# Patient Record
Sex: MALE | Race: WHITE | Employment: FULL TIME | URBAN - METROPOLITAN AREA
[De-identification: names, ages, dates, MRNs, and addresses within clinical notes are randomized per-mention and may not be internally consistent; named-entity substitution may affect disease eponyms.]

---

## 2019-04-06 ENCOUNTER — APPOINTMENT (OUTPATIENT)
Dept: GENERAL RADIOLOGY | Age: 29
End: 2019-04-06
Payer: COMMERCIAL

## 2019-04-06 ENCOUNTER — HOSPITAL ENCOUNTER (EMERGENCY)
Age: 29
Discharge: HOME | End: 2019-04-06
Attending: EMERGENCY MEDICINE
Payer: COMMERCIAL

## 2019-04-06 VITALS
RESPIRATION RATE: 18 BRPM | SYSTOLIC BLOOD PRESSURE: 164 MMHG | TEMPERATURE: 98.1 F | BODY MASS INDEX: 31.78 KG/M2 | WEIGHT: 222 LBS | HEART RATE: 105 BPM | OXYGEN SATURATION: 98 % | DIASTOLIC BLOOD PRESSURE: 89 MMHG | HEIGHT: 70 IN

## 2019-04-06 DIAGNOSIS — R06.2 WHEEZING: Primary | ICD-10-CM

## 2019-04-06 PROCEDURE — 71046 X-RAY EXAM CHEST 2 VIEWS: CPT

## 2019-04-06 PROCEDURE — 94664 DEMO&/EVAL PT USE INHALER: CPT

## 2019-04-06 PROCEDURE — 94644 CONT INHLJ TX 1ST HOUR: CPT

## 2019-04-06 PROCEDURE — 6370000000 HC RX 637 (ALT 250 FOR IP): Performed by: EMERGENCY MEDICINE

## 2019-04-06 PROCEDURE — 94640 AIRWAY INHALATION TREATMENT: CPT

## 2019-04-06 PROCEDURE — 99285 EMERGENCY DEPT VISIT HI MDM: CPT

## 2019-04-06 PROCEDURE — 6360000002 HC RX W HCPCS: Performed by: EMERGENCY MEDICINE

## 2019-04-06 RX ORDER — PREDNISONE 20 MG/1
60 TABLET ORAL ONCE
Status: COMPLETED | OUTPATIENT
Start: 2019-04-06 | End: 2019-04-06

## 2019-04-06 RX ORDER — IPRATROPIUM BROMIDE AND ALBUTEROL SULFATE 2.5; .5 MG/3ML; MG/3ML
1 SOLUTION RESPIRATORY (INHALATION) ONCE
Status: COMPLETED | OUTPATIENT
Start: 2019-04-06 | End: 2019-04-06

## 2019-04-06 RX ORDER — PREDNISONE 20 MG/1
60 TABLET ORAL DAILY
Qty: 12 TABLET | Refills: 0 | Status: SHIPPED | OUTPATIENT
Start: 2019-04-07 | End: 2019-04-11

## 2019-04-06 RX ORDER — IBUPROFEN 600 MG/1
600 TABLET ORAL EVERY 6 HOURS PRN
COMMUNITY

## 2019-04-06 RX ORDER — ALBUTEROL SULFATE 2.5 MG/3ML
5 SOLUTION RESPIRATORY (INHALATION) ONCE
Status: COMPLETED | OUTPATIENT
Start: 2019-04-06 | End: 2019-04-06

## 2019-04-06 RX ORDER — ALBUTEROL SULFATE 90 UG/1
2 AEROSOL, METERED RESPIRATORY (INHALATION) EVERY 4 HOURS PRN
Qty: 1 INHALER | Refills: 0 | Status: SHIPPED | OUTPATIENT
Start: 2019-04-06

## 2019-04-06 RX ADMIN — IPRATROPIUM BROMIDE AND ALBUTEROL SULFATE 1 AMPULE: .5; 3 SOLUTION RESPIRATORY (INHALATION) at 20:18

## 2019-04-06 RX ADMIN — PREDNISONE 60 MG: 20 TABLET ORAL at 20:02

## 2019-04-06 RX ADMIN — ALBUTEROL SULFATE 5 MG: 2.5 SOLUTION RESPIRATORY (INHALATION) at 21:01

## 2019-04-06 ASSESSMENT — PAIN DESCRIPTION - PAIN TYPE: TYPE: ACUTE PAIN

## 2019-04-06 ASSESSMENT — PAIN SCALES - GENERAL
PAINLEVEL_OUTOF10: 4
PAINLEVEL_OUTOF10: 0

## 2019-04-06 ASSESSMENT — PAIN DESCRIPTION - LOCATION: LOCATION: HEAD

## 2019-04-06 ASSESSMENT — PAIN DESCRIPTION - DESCRIPTORS: DESCRIPTORS: ACHING

## 2019-04-07 NOTE — ED PROVIDER NOTES
connections:     Talks on phone: Not on file     Gets together: Not on file     Attends Orthodoxy service: Not on file     Active member of club or organization: Not on file     Attends meetings of clubs or organizations: Not on file     Relationship status: Not on file    Intimate partner violence:     Fear of current or ex partner: Not on file     Emotionally abused: Not on file     Physically abused: Not on file     Forced sexual activity: Not on file   Other Topics Concern    Not on file   Social History Narrative    Not on file     No current facility-administered medications for this encounter. Current Outpatient Medications   Medication Sig Dispense Refill    ibuprofen (ADVIL;MOTRIN) 600 MG tablet Take 600 mg by mouth every 6 hours as needed for Pain      [START ON 4/7/2019] predniSONE (DELTASONE) 20 MG tablet Take 3 tablets by mouth daily for 4 days 12 tablet 0    albuterol sulfate HFA (PROVENTIL HFA) 108 (90 Base) MCG/ACT inhaler Inhale 2 puffs into the lungs every 4 hours as needed for Wheezing or Shortness of Breath 1 Inhaler 0     Allergies   Allergen Reactions    Silver Nitrate        Nursing Notes Reviewed    ROS:  General: no fevers/chills  HEENT: no congestion, no sore throat, no ear pain  Eyes: no discharge, no vision changes  Cardiac: no chest pain, no palpitations  Pulmonary: + SOB, minimal cough  GI: no N/V/D, no abdominal pain  : no dysuria  Musculoskeletal: no joint pain or swelling  Skin: no rash or unusual bruising  Neurologic: + headaches, no dizziness    Physical Exam:  ED Triage Vitals [04/06/19 1931]   Enc Vitals Group      BP (!) 147/100      Pulse 99      Resp 16      Temp 98.1 °F (36.7 °C)      Temp Source Oral      SpO2 95 %      Weight 222 lb 0.1 oz (100.7 kg)      Height 5' 10\" (1.778 m)      Head Circumference       Peak Flow       Pain Score       Pain Loc       Pain Edu? Excl. in [address]?       My pulse oximetry interpretation is which is within the normal range    GENERAL APPEARANCE: Awake and alert. Cooperative. Well appearing. HEAD: Normocephalic. Atraumatic. EYES: PERRL, normal conjunctiva  ENT: Mucous membranes are moist.  Hypertrophic tonsils, uvula midline, no erythema or edema  NECK: Supple. HEART: RRR. LUNGS: Respirations unlabored without retractions. Inspiratory wheezing in all lung fields with good air exchange  ABDOMEN: Soft. Non-tender, nondistended. No guarding or rebound. EXTREMITIES: No acute deformities. No swelling or edema  SKIN: Warm and dry. NEUROLOGICAL: Alert at baseline mental status. Fluent speech  PSYCHIATRIC: Normal mood. I have reviewed and interpreted all of the currently available lab results from this visit (if applicable):  No results found for this visit on 04/06/19. Radiographs:  [x] Radiologist's Report Reviewed:     XR CHEST STANDARD (2 VW) (Final result)   Result time 04/06/19 19:51:36   Final result by Gabriel Marinelli MD (04/06/19 19:51:36)                Impression:    Unremarkable. MDM:  23-year-old male who presents with one day of wheezing and shortness of breath. Consider pneumonia, PE, bronchitis, bronchospasm. Patient's history and physical is most consistent with bronchospasm. This may be related to environmental exposure of dust in his new home. Chest x-ray shows no pneumonia, pulmonary edema, pleural effusion or pneumothorax. He is given oral prednisone and inhaled beta agonists. On reevaluation wheezing is much improved and he feels much better. He is stable for discharge home. Discussed home care, follow-up with PCP and return precautions for any new or worsening symptoms. Clinical Impression:  1.  Wheezing          Disposition referral (if applicable):  Subhash Howe  [address] [place] [id number]  [phone]    Schedule an appointment as soon as possible for a visit in 3 days  To recheck today's complaints    [facility]